# Patient Record
Sex: MALE | Race: WHITE | NOT HISPANIC OR LATINO | URBAN - METROPOLITAN AREA
[De-identification: names, ages, dates, MRNs, and addresses within clinical notes are randomized per-mention and may not be internally consistent; named-entity substitution may affect disease eponyms.]

---

## 2023-10-23 PROBLEM — E66.811 CLASS 1 OBESITY WITH BODY MASS INDEX (BMI) OF 30.0 TO 30.9 IN ADULT: Status: ACTIVE | Noted: 2023-10-23

## 2023-10-23 PROBLEM — E66.9 CLASS 1 OBESITY WITH BODY MASS INDEX (BMI) OF 30.0 TO 30.9 IN ADULT: Status: ACTIVE | Noted: 2023-10-23

## 2023-10-23 PROBLEM — F33.8 SEASONAL AFFECTIVE DISORDER (CMS-HCC): Status: ACTIVE | Noted: 2023-10-23

## 2023-10-23 PROBLEM — F32.A DEPRESSION, CONTROLLED: Status: ACTIVE | Noted: 2023-10-23

## 2023-10-23 RX ORDER — MONTELUKAST SODIUM 10 MG/1
TABLET ORAL
COMMUNITY

## 2023-10-23 RX ORDER — AZELASTINE HCL 205.5 UG/1
SPRAY NASAL
COMMUNITY

## 2023-10-23 RX ORDER — BUPROPION HYDROCHLORIDE 300 MG/1
1 TABLET ORAL DAILY
COMMUNITY
Start: 2018-12-18

## 2023-10-23 RX ORDER — TRIAMCINOLONE ACETONIDE 55 UG/1
SPRAY, METERED NASAL
COMMUNITY

## 2023-10-23 RX ORDER — PAROXETINE 30 MG/1
TABLET, FILM COATED ORAL
COMMUNITY

## 2023-10-23 RX ORDER — ALBUTEROL SULFATE 90 UG/1
AEROSOL, METERED RESPIRATORY (INHALATION)
COMMUNITY

## 2023-10-25 ENCOUNTER — ALLIED HEALTH (OUTPATIENT)
Dept: INTEGRATIVE MEDICINE | Facility: CLINIC | Age: 43
End: 2023-10-25
Payer: COMMERCIAL

## 2023-10-25 DIAGNOSIS — M99.04 SEGMENTAL AND SOMATIC DYSFUNCTION OF SACRAL REGION: ICD-10-CM

## 2023-10-25 DIAGNOSIS — M99.01 SEGMENTAL AND SOMATIC DYSFUNCTION OF CERVICAL REGION: ICD-10-CM

## 2023-10-25 DIAGNOSIS — M54.50 LUMBOSACRAL PAIN: ICD-10-CM

## 2023-10-25 DIAGNOSIS — M79.10 MYALGIA: ICD-10-CM

## 2023-10-25 DIAGNOSIS — M99.02 SEGMENTAL AND SOMATIC DYSFUNCTION OF THORACIC REGION: ICD-10-CM

## 2023-10-25 DIAGNOSIS — M54.2 CERVICALGIA: ICD-10-CM

## 2023-10-25 DIAGNOSIS — M99.03 SEGMENTAL AND SOMATIC DYSFUNCTION OF LUMBAR REGION: Primary | ICD-10-CM

## 2023-10-25 PROCEDURE — 99203 OFFICE O/P NEW LOW 30 MIN: CPT | Performed by: CHIROPRACTOR

## 2023-10-25 PROCEDURE — 97112 NEUROMUSCULAR REEDUCATION: CPT | Performed by: CHIROPRACTOR

## 2023-10-25 PROCEDURE — 98941 CHIROPRACT MANJ 3-4 REGIONS: CPT | Performed by: CHIROPRACTOR

## 2023-10-25 NOTE — PROGRESS NOTES
Subjective   Patient ID: Long Rice is a 43 y.o. male who presents today, October 25, 2023 for a new patient evaluation and treatment of low back and neck pain.    (1/12) [facility]    Chiropractic Medicine HPI:  Provacative factors include : sitting, standing, and walking  Palliative factors incude : stretching  Pain is described as : ache dull sharp   Previous treatment for complaint has included : ice Physical Therapy  Patient denies : difficulty walking bladder weakness bowel weakness catching clicking grinding instability numbness popping radiating pain into the distal extremity trauma  Intensity of the pain since last visit: Patient rates least severe pain at : 5/10 Patient rates most severe pain at : 8/10  Oswestry Disability Index has been completed: yes     Long presents for evaluation and treatment of low back and neck pain. Patient states that symptoms began many years ago without injury. He states that low back pain goes across the low back and into the right buttock and can go into the right groin. He states that neck pain starts at the base of the skull and goes to the top of the shoulder and to the lateral aspect of the upper arm. He states that sitting will increase neck symptoms while standing and walking increase low back symptoms. He states that he had PT for low back symptoms several years ago and continues to do the stretches and exercises from PT. Patient states that he has had previous chiropractic care for these symptoms and noted benefit from care. Patient denies any headaches, difficulty speaking/swallowing, vision changes, bowel/bladder issues, chest pain/shortness of breath, numbness/tingling.            Objective   Review of Systems   Constitutional:  Negative for chills, fatigue, fever and unexpected weight change.   HENT:  Negative for trouble swallowing.    Respiratory:  Negative for chest tightness and shortness of breath.    Cardiovascular:  Negative for chest pain and leg swelling.    Gastrointestinal:  Negative for vomiting.   Genitourinary:  Negative for difficulty urinating and flank pain.   Musculoskeletal:  Positive for back pain, myalgias, neck pain and neck stiffness. Negative for arthralgias, gait problem and joint swelling.   Neurological:  Negative for dizziness, weakness, light-headedness, numbness and headaches.   Psychiatric/Behavioral:  Negative for self-injury and suicidal ideas.    All other systems reviewed and are negative.    Physical Exam  Constitutional:       General: He is not in acute distress.     Appearance: Normal appearance.       HENT:      Head: Normocephalic and atraumatic.   Musculoskeletal:      Cervical back: Tenderness present. Decreased range of motion.      Thoracic back: Tenderness present.      Lumbar back: Tenderness present. Decreased range of motion.   Neurological:      General: No focal deficit present.      Mental Status: He is alert and oriented to person, place, and time.      Cranial Nerves: No dysarthria or facial asymmetry.      Sensory: Sensation is intact.      Motor: Motor function is intact.      Coordination: Coordination is intact.      Gait: Gait is intact.   Psychiatric:         Attention and Perception: Attention normal.         Mood and Affect: Mood normal.         Speech: Speech normal.         Behavior: Behavior is cooperative.        Spine Musculoskeletal Exam    Gait    Gait is normal.    Inspection    Shoulder elevation: left    Cervical Spine    Cervical spine inspection additional comments: Right hip hike    Palpation    Cervical Spine    Tenderness: present      Paraspinous: right and left      Trapezius: left      Periscapular: right and left    Right      Muscle tone: increased    Left      Muscle tone: increased    Thoracolumbar    Tenderness: present      Paraspinous: right and left      Gluteal: right      Piriformis: right    Right      Muscle tone: increased    Left      Muscle tone: increased    Range of Motion     Cervical Spine       Cervical flexion: chin to chest. Cervical flexion detail: no pain.     Cervical extension: reduced extension (0-25%). Cervical extension detail: pain.       Right      Lateral bending: normal. Lateral bending detail: no pain.       Lateral rotation: reduced rotation (0-25%). Lateral rotation detail: no pain.       Left      Lateral bending: reduced bend (0-25%). Lateral bending detail: pain.       Lateral rotation: reduced rotation (0-25%). Lateral rotation detail: pain.      Thoracolumbar       Flexion: 75%. Flexion detail: no pain.     Extension: 75%. Extension detail: pain.       Right      Lateral bendin%. Lateral bending detail: no pain.       Lateral rotation: normal. Lateral rotation detail: no pain.       Left      Lateral bendin%. Lateral bending detail: pain.       Lateral rotation: normal. Lateral rotation detail: no pain.      Strength    Cervical Spine    Cervical spine motor exam is normal.    Thoracolumbar    Thoracolumbar motor exam is normal.       Sensory    Cervical Spine    Cervical spine sensation is normal.    Thoracolumbar    Thoracolumbar sensation is normal.    Special Tests    Cervical Spine    Cervical distraction - neck: decreased pain    General    Neurological: alert     Other Orthopedic Tests:  Cervical: Right Maximum compression with local pain.  Left Maximum compression with local pain.  Cervical distraction positive.  Thoracic/Lumbar/Sacrum: Right Melton's Lumbar with local pain.  Left Melton's Lumbar painless.  Right FABERE's with local pain.  Segmental Joint(s): Segmental joint dysfunction was assessed with motion palpation and is identified in the following areas:  Cervical : C4 C6 C7  Thoracic : T2., T3, T8, and T9  Lumbopelvic / Sacral SIJ : L4, L5, L5/S1, and R SIJ  Upper / Lower Extremities : L Glenohumeral joint, R Hip, and L Hip      Assessment/Plan   Today's Treatment Included: Chiropractic manipulation to the Segmental Joint(s) Cervical : C4 C6  C7 Segmental Joint(s) Lumbopelvic/Sacral SIJ : L4, L5, L5/S1, and R SIJ Segmental Joint(s) Thoracic : T2., T3, T8, and T9 Segmental Joints Upper/Lower Extremities : L Glenohumeral joint, R Hip, and L Hip  Treatment Techniques Used : Diversified CMT, Pelvic drop table technique, and Manual traction  Neuromuscular Re-Education (83825): Start time: 2:45 pm End time: 2:55 pm  1 Units  Pin and stretch  Soft-Tissue manipulation    Soft-tissue mobilization was performed in the following areas:   Cervical paraspinal mm. bilateral, Upper Trapezius left, and Levator Scap. left  Thoracic Paraspinal mm. bilateral  Lumbar Paraspinal mm. bilateral, Quadratus Lumborum right, Gluteal mm. Glute. Med. right, and Piriformis right            Treatment Plan: The patient and I discussed the risks and benefits of Chiropractic Care.  Based on the patient's subjective complaints along with the examination findings, it is advised that a course of Chiropractic Treatment by initiated in the form of:   Chiropractic Manipulation (CMT)  Neuro-Muscular Re-education  Chiropractic treatment recommended at a frequency of 1 times per week for 4 weeks  Consent for care was given both written and orally by the patient.  The goals of the treatment will be to: reduce lower back pain, reduce neck pain, improve quality of life, improve ability to walk, improve the ability to stand, and decrease muscular hypertonicity  The patient tolerated today's treatment with little or no additional discomfort and was instructed to contact the office for questions or concerns.   Follow up as scheduled.

## 2023-10-26 ASSESSMENT — ENCOUNTER SYMPTOMS
FLANK PAIN: 0
NUMBNESS: 0
HEADACHES: 0
DIZZINESS: 0
JOINT SWELLING: 0
FEVER: 0
TROUBLE SWALLOWING: 0
SHORTNESS OF BREATH: 0
BACK PAIN: 1
WEAKNESS: 0
CHEST TIGHTNESS: 0
NECK PAIN: 1
CHILLS: 0
MYALGIAS: 1
LIGHT-HEADEDNESS: 0
UNEXPECTED WEIGHT CHANGE: 0
ARTHRALGIAS: 0
NECK STIFFNESS: 1
VOMITING: 0
FATIGUE: 0
DIFFICULTY URINATING: 0

## 2023-11-01 ENCOUNTER — ALLIED HEALTH (OUTPATIENT)
Dept: INTEGRATIVE MEDICINE | Facility: CLINIC | Age: 43
End: 2023-11-01
Payer: COMMERCIAL

## 2023-11-01 DIAGNOSIS — M79.10 MYALGIA: ICD-10-CM

## 2023-11-01 DIAGNOSIS — M54.50 LUMBOSACRAL PAIN: ICD-10-CM

## 2023-11-01 DIAGNOSIS — M54.2 CERVICALGIA: ICD-10-CM

## 2023-11-01 DIAGNOSIS — M99.04 SEGMENTAL AND SOMATIC DYSFUNCTION OF SACRAL REGION: ICD-10-CM

## 2023-11-01 DIAGNOSIS — M99.01 SEGMENTAL AND SOMATIC DYSFUNCTION OF CERVICAL REGION: ICD-10-CM

## 2023-11-01 DIAGNOSIS — M99.03 SEGMENTAL AND SOMATIC DYSFUNCTION OF LUMBAR REGION: Primary | ICD-10-CM

## 2023-11-01 DIAGNOSIS — M99.02 SEGMENTAL AND SOMATIC DYSFUNCTION OF THORACIC REGION: ICD-10-CM

## 2023-11-01 PROCEDURE — 97112 NEUROMUSCULAR REEDUCATION: CPT | Performed by: CHIROPRACTOR

## 2023-11-01 PROCEDURE — 98941 CHIROPRACT MANJ 3-4 REGIONS: CPT | Performed by: CHIROPRACTOR

## 2023-11-01 NOTE — PROGRESS NOTES
Subjective   Patient ID: Long Rice is a 43 y.o. male who presents November 1, 2023 for low back and neck pain.    (2/12) [facility]    Today, the patient rates their degree of pain as a 4 out of 10 on the numeric pain rating scale.     Long presents for continued treatment of low back and neck pain. Patient states that he had improvement in symptoms after last visit that have lasted until today. He states that he has less sharp pain in his right hip, it is more achy. He states that he has less discomfort in his neck and better cervical range of motion. He denies any soreness after last visit. Denies any associated symptoms or change in medical history since last visit and will follow up in 1 week.           Objective   Physical Exam  Constitutional:       General: He is not in acute distress.     Appearance: Normal appearance.       HENT:      Head: Normocephalic and atraumatic.   Musculoskeletal:      Cervical back: Tenderness present. Decreased range of motion.      Thoracic back: Tenderness present.      Lumbar back: Tenderness present. Decreased range of motion.   Neurological:      General: No focal deficit present.      Mental Status: He is alert and oriented to person, place, and time.      Cranial Nerves: No dysarthria or facial asymmetry.      Sensory: Sensation is intact.      Motor: Motor function is intact.      Coordination: Coordination is intact.      Gait: Gait is intact.   Psychiatric:         Attention and Perception: Attention normal.         Mood and Affect: Mood normal.         Speech: Speech normal.         Behavior: Behavior is cooperative.       Palpation of the following region(s) revealed:  Cervical: Upper trapezius left, hypertonicity and tenderness.  Cervical paraspinals bilateral, hypertonicity and tenderness.  Thoracic: Thoracic paraspinals bilateral, hypertonicity and tenderness.  Lumbar: Lumbar paraspinals bilateral, hypertonicity and tenderness.  Gluteal right, hypertonicity and  tenderness.  Piriformis right, hypertonicity and tenderness.        Segmental Joint(s): Segmental joint dysfunction was assessed with motion palpation and is identified in the following areas:  Cervical : C4 C6 C7  Thoracic : T2., T3, T8, and T9  Lumbopelvic / Sacral SIJ : L4, L5, L5/S1, and R SIJ  Upper / Lower Extremities : L Glenohumeral joint, R Hip, and L Hip      Assessment/Plan   Today's Treatment Included: Chiropractic manipulation to the Segmental Joint(s) Cervical : C4 C6 C7 Segmental Joint(s) Lumbopelvic/Sacral SIJ : L4, L5, L5/S1, and R SIJ Segmental Joint(s) Thoracic : T2., T3, T8, and T9 Segmental Joints Upper/Lower Extremities : L Glenohumeral joint, R Hip, and L Hip  Treatment Techniques Used : Diversified CMT, Pelvic drop table technique, and Manual traction  Neuromuscular Re-Education (85369): Start time: 2:25 pm End time: 2:35 pm  1 Units  Pin and stretch  Soft-Tissue manipulation    Soft-tissue mobilization was performed in the following areas:   Cervical paraspinal mm. bilateral and Upper Trapezius left  Thoracic Paraspinal mm. bilateral and Middle Trapezius bilateral  Lumbar Paraspinal mm. bilateral and Gluteal mm. Glute. Med. right            The patient tolerated today's treatment with little or no additional discomfort and was instructed to contact the office for questions or concerns.

## 2023-11-08 ENCOUNTER — ALLIED HEALTH (OUTPATIENT)
Dept: INTEGRATIVE MEDICINE | Facility: CLINIC | Age: 43
End: 2023-11-08
Payer: COMMERCIAL

## 2023-11-08 DIAGNOSIS — M99.04 SEGMENTAL AND SOMATIC DYSFUNCTION OF SACRAL REGION: ICD-10-CM

## 2023-11-08 DIAGNOSIS — M54.50 LUMBOSACRAL PAIN: ICD-10-CM

## 2023-11-08 DIAGNOSIS — M99.03 SEGMENTAL AND SOMATIC DYSFUNCTION OF LUMBAR REGION: Primary | ICD-10-CM

## 2023-11-08 DIAGNOSIS — M79.10 MYALGIA: ICD-10-CM

## 2023-11-08 DIAGNOSIS — M99.02 SEGMENTAL AND SOMATIC DYSFUNCTION OF THORACIC REGION: ICD-10-CM

## 2023-11-08 DIAGNOSIS — M99.01 SEGMENTAL AND SOMATIC DYSFUNCTION OF CERVICAL REGION: ICD-10-CM

## 2023-11-08 DIAGNOSIS — M54.2 CERVICALGIA: ICD-10-CM

## 2023-11-08 PROCEDURE — 98941 CHIROPRACT MANJ 3-4 REGIONS: CPT | Performed by: CHIROPRACTOR

## 2023-11-08 PROCEDURE — 97112 NEUROMUSCULAR REEDUCATION: CPT | Performed by: CHIROPRACTOR

## 2023-11-08 NOTE — PROGRESS NOTES
Subjective   Patient ID: Long Rice is a 43 y.o. male who presents November 8, 2023 for low back and neck pain.    (3/12) [facility]    Today, the patient rates their degree of pain as a 3 out of 10 on the numeric pain rating scale.     Long presents for continued treatment of low back and neck pain. Patient states that he continues to have improvement in symptoms. He states that he has not had any sharp pain in his low back since treatment began. He states that he has more stiffness in his neck/upper back that pain. Denies any associated symptoms or change in medical history since last visit and will follow up in 1 week.           Objective   Physical Exam  Constitutional:       General: He is not in acute distress.     Appearance: Normal appearance.       HENT:      Head: Normocephalic and atraumatic.   Musculoskeletal:      Cervical back: Tenderness present. Decreased range of motion.      Thoracic back: Tenderness present.      Lumbar back: Tenderness present. Decreased range of motion.   Neurological:      General: No focal deficit present.      Mental Status: He is alert and oriented to person, place, and time.      Cranial Nerves: No dysarthria or facial asymmetry.      Sensory: Sensation is intact.      Motor: Motor function is intact.      Coordination: Coordination is intact.      Gait: Gait is intact.   Psychiatric:         Attention and Perception: Attention normal.         Mood and Affect: Mood normal.         Speech: Speech normal.         Behavior: Behavior is cooperative.       Palpation of the following region(s) revealed:  Cervical: Upper trapezius left, hypertonicity and tenderness.  Cervical paraspinals bilateral, hypertonicity and tenderness.  Thoracic: Thoracic paraspinals bilateral, hypertonicity and tenderness.  Lumbar: Lumbar paraspinals bilateral, hypertonicity and tenderness.  Gluteal right, hypertonicity and tenderness.  Piriformis right, hypertonicity and tenderness.        Segmental  Joint(s): Segmental joint dysfunction was assessed with motion palpation and is identified in the following areas:  Cervical : C4 C6 C7  Thoracic : T2., T3, T8, and T9  Lumbopelvic / Sacral SIJ : L4, L5, L5/S1, and R SIJ  Upper / Lower Extremities : L Glenohumeral joint, R Hip, and L Hip      Assessment/Plan   Today's Treatment Included: Chiropractic manipulation to the Segmental Joint(s) Cervical : C4 C6 C7 Segmental Joint(s) Lumbopelvic/Sacral SIJ : L4, L5, L5/S1, and R SIJ Segmental Joint(s) Thoracic : T2., T3, T8, and T9 Segmental Joints Upper/Lower Extremities : L Glenohumeral joint, R Hip, and L Hip  Treatment Techniques Used : Diversified CMT, Pelvic drop table technique, and Manual traction  Neuromuscular Re-Education (67194): Start time: 2:25 pm End time: 2:35 pm  1 Units  Pin and stretch  Soft-Tissue manipulation    Soft-tissue mobilization was performed in the following areas:   Cervical paraspinal mm. bilateral and Upper Trapezius left  Thoracic Paraspinal mm. bilateral and Middle Trapezius bilateral  Lumbar Paraspinal mm. bilateral and Gluteal mm. Glute. Med. right            The patient tolerated today's treatment with little or no additional discomfort and was instructed to contact the office for questions or concerns.

## 2023-11-14 ENCOUNTER — OFFICE VISIT (OUTPATIENT)
Dept: PRIMARY CARE | Facility: CLINIC | Age: 43
End: 2023-11-14
Payer: COMMERCIAL

## 2023-11-14 VITALS
OXYGEN SATURATION: 96 % | HEIGHT: 74 IN | BODY MASS INDEX: 29.03 KG/M2 | HEART RATE: 91 BPM | SYSTOLIC BLOOD PRESSURE: 121 MMHG | DIASTOLIC BLOOD PRESSURE: 86 MMHG | WEIGHT: 226.2 LBS

## 2023-11-14 DIAGNOSIS — F32.A DEPRESSION, CONTROLLED: ICD-10-CM

## 2023-11-14 DIAGNOSIS — F33.8 SEASONAL AFFECTIVE DISORDER (CMS-HCC): ICD-10-CM

## 2023-11-14 DIAGNOSIS — Z00.00 HEALTHCARE MAINTENANCE: Primary | ICD-10-CM

## 2023-11-14 PROCEDURE — 99396 PREV VISIT EST AGE 40-64: CPT | Performed by: STUDENT IN AN ORGANIZED HEALTH CARE EDUCATION/TRAINING PROGRAM

## 2023-11-14 PROCEDURE — 1036F TOBACCO NON-USER: CPT | Performed by: STUDENT IN AN ORGANIZED HEALTH CARE EDUCATION/TRAINING PROGRAM

## 2023-11-14 NOTE — PROGRESS NOTES
"Subjective   Patient ID: Long Rice is a 43 y.o. male who presents for No chief complaint on file..    HPI    Doing well since last in. His drawings are doing well, and he's in final stages of creating a board game.     Re: Weight - Has lost ~20 lb since last in, now in the overweight category. Cleaned up his diet demonstrably since last in.     Re: depression - controlled on wellbutrin and paxil. His psych provider is retiring soon and will need refills through me going forward. I will refill when next due. In with psychiatry q3 months and psychology q2 weeks.     Re: HM - flu shot is current. CRS and PSA not yet due.       PMHx, FHx, Social Hx, Surg Hx personally reviewed at this appointment. No pertinent findings and/or changes from prior (if applicable).     ROS: Denies wt gain/loss f/c HA LoC CP SOB NVDC. See HPI above, and scanned sheet (if applicable). All other systems are reviewed and are without complaint.     Review of Systems    Objective   /86   Pulse 91   Ht 1.88 m (6' 2\")   Wt 103 kg (226 lb 3.2 oz)   SpO2 96%   BMI 29.04 kg/m²     Physical Exam  Gen: well developed in NAD. AAO x3.  HEENT: NC/AT. Anicteric sclera, symmetric pupils. MMM no thrush.  Neck: Soft, supple. No LAD. No goiter.   CV: RRR nl s1s2 no m/r/g  Pulm: CTAB no w/r/r, good air exchange  GI: soft NTND BS+ no hsm  Ext: WWP no edema  Neuro: II-XII grossly intact, nonfocal systemic findings  MSK: 5/5 strength b/l UE and LE  Gait: unremarkable    Lab Results   Component Value Date    WBC 8.7 09/08/2022    HGB 15.0 09/08/2022    HCT 46.7 09/08/2022     09/08/2022    CHOL 209 (H) 09/08/2022    TRIG 190 (H) 09/08/2022    HDL 39.0 (A) 09/08/2022    ALT 44 09/08/2022    AST 24 09/08/2022     09/08/2022    K 4.9 09/08/2022     09/08/2022    CREATININE 0.96 09/08/2022    BUN 11 09/08/2022    CO2 30 09/08/2022    TSH 1.98 09/08/2022    HGBA1C 5.1 09/08/2022     par    ELECTROCARDIOGRAM RHYTHM STRIP  Ordered by an " unspecified provider.     Assessment/Plan   # Depression and/or Anxiety: stable  - continue SSRI and Wellbutrin     # Health Maintenance  - routine blood work  - Colonoscopy: Not yet indicated   - PSA: Not yet indicated  - Immunizations: UTD, recommend COVID booster at pharmacy     Problem List Items Addressed This Visit             ICD-10-CM    Seasonal affective disorder (CMS/LTAC, located within [facility]) F33.8    Depression, controlled F32.A     Other Visit Diagnoses         Codes    Healthcare maintenance    -  Primary Z00.00    Relevant Orders    CBC and Auto Differential    Comprehensive Metabolic Panel    Lipid Panel    Hemoglobin A1C

## 2023-11-14 NOTE — PATIENT INSTRUCTIONS
Please stop at the lab ([address]) to complete your blood and/or urine work that I've ordered for you.    I will contact you with the results at my soonest convenience. I strongly urge you to use [employer] as this is the quickest and easiest way to access your results and receive my correspondences.    Congrats on the weight loss! Keep at it with diet and exercise.     Your medications are up to date today, I will renew them when a refill is due.     See me yearly for a complete physical exam, and sooner as needed for sick visits

## 2023-11-15 ENCOUNTER — ALLIED HEALTH (OUTPATIENT)
Dept: INTEGRATIVE MEDICINE | Facility: CLINIC | Age: 43
End: 2023-11-15
Payer: COMMERCIAL

## 2023-11-15 DIAGNOSIS — M99.04 SEGMENTAL AND SOMATIC DYSFUNCTION OF SACRAL REGION: ICD-10-CM

## 2023-11-15 DIAGNOSIS — M99.02 SEGMENTAL AND SOMATIC DYSFUNCTION OF THORACIC REGION: ICD-10-CM

## 2023-11-15 DIAGNOSIS — M99.01 SEGMENTAL AND SOMATIC DYSFUNCTION OF CERVICAL REGION: ICD-10-CM

## 2023-11-15 DIAGNOSIS — M54.50 LUMBOSACRAL PAIN: ICD-10-CM

## 2023-11-15 DIAGNOSIS — M54.2 CERVICALGIA: ICD-10-CM

## 2023-11-15 DIAGNOSIS — M99.03 SEGMENTAL AND SOMATIC DYSFUNCTION OF LUMBAR REGION: Primary | ICD-10-CM

## 2023-11-15 DIAGNOSIS — M79.10 MYALGIA: ICD-10-CM

## 2023-11-15 PROCEDURE — 98941 CHIROPRACT MANJ 3-4 REGIONS: CPT | Performed by: CHIROPRACTOR

## 2023-11-15 PROCEDURE — 97112 NEUROMUSCULAR REEDUCATION: CPT | Performed by: CHIROPRACTOR

## 2023-11-15 NOTE — PROGRESS NOTES
Subjective   Patient ID: Long Rice is a 43 y.o. male who presents November 15, 2023 for low back and neck pain.    (4/12) [facility]    Today, the patient rates their degree of pain as a 2 out of 10 on the numeric pain rating scale.     Long presents for continued treatment of low back and neck pain. Patient states that he continues to have improvement in symptoms. He states that he does not have any pain going into his right leg. He states that rolling on a tennis ball has helped reduce symptoms in the buttock.  He states that he has had improvement in neck/upper back symptoms. Denies any associated symptoms or change in medical history since last visit and will follow up in 1 week.           Objective   Physical Exam  Constitutional:       General: He is not in acute distress.     Appearance: Normal appearance.       HENT:      Head: Normocephalic and atraumatic.   Musculoskeletal:      Cervical back: Tenderness present. Decreased range of motion.      Thoracic back: Tenderness present.      Lumbar back: Tenderness present. Decreased range of motion.   Neurological:      General: No focal deficit present.      Mental Status: He is alert and oriented to person, place, and time.      Cranial Nerves: No dysarthria or facial asymmetry.      Sensory: Sensation is intact.      Motor: Motor function is intact.      Coordination: Coordination is intact.      Gait: Gait is intact.   Psychiatric:         Attention and Perception: Attention normal.         Mood and Affect: Mood normal.         Speech: Speech normal.         Behavior: Behavior is cooperative.         Palpation of the following region(s) revealed:  Cervical: Upper trapezius left, hypertonicity and tenderness.  Cervical paraspinals bilateral, hypertonicity and tenderness.  Thoracic: Thoracic paraspinals bilateral, hypertonicity and tenderness.  Lumbar: Lumbar paraspinals bilateral, hypertonicity and tenderness.  Gluteal right, hypertonicity and  tenderness.  Piriformis right, hypertonicity and tenderness.        Segmental Joint(s): Segmental joint dysfunction was assessed with motion palpation and is identified in the following areas:  Cervical : C4 C6 C7  Thoracic : T2., T3, T8, and T9  Lumbopelvic / Sacral SIJ : L4, L5, L5/S1, and R SIJ  Upper / Lower Extremities : L Glenohumeral joint, R Hip, and L Hip      Assessment/Plan   Today's Treatment Included: Chiropractic manipulation to the Segmental Joint(s) Cervical : C4 C6 C7 Segmental Joint(s) Lumbopelvic/Sacral SIJ : L4, L5, L5/S1, and R SIJ Segmental Joint(s) Thoracic : T2., T3, T8, and T9 Segmental Joints Upper/Lower Extremities : L Glenohumeral joint, R Hip, and L Hip  Treatment Techniques Used : Diversified CMT, Pelvic drop table technique, and Manual traction  Neuromuscular Re-Education (13111): Start time: 2:45 pm End time: 2:55 pm  1 Units  Pin and stretch  Soft-Tissue manipulation    Soft-tissue mobilization was performed in the following areas:   Cervical paraspinal mm. left, Upper Trapezius left, and Levator Scap. left  Thoracic Paraspinal mm. bilateral and Middle Trapezius bilateral  Lumbar Paraspinal mm. bilateral, Gluteal mm. Glute. Med. right, and Piriformis right            The patient tolerated today's treatment with little or no additional discomfort and was instructed to contact the office for questions or concerns.

## 2023-11-17 ENCOUNTER — LAB (OUTPATIENT)
Dept: LAB | Facility: LAB | Age: 43
End: 2023-11-17
Payer: COMMERCIAL

## 2023-11-17 DIAGNOSIS — Z00.00 HEALTHCARE MAINTENANCE: ICD-10-CM

## 2023-11-17 LAB
ALBUMIN SERPL BCP-MCNC: 4.3 G/DL (ref 3.4–5)
ALP SERPL-CCNC: 59 U/L (ref 33–120)
ALT SERPL W P-5'-P-CCNC: 38 U/L (ref 10–52)
ANION GAP SERPL CALC-SCNC: 12 MMOL/L (ref 10–20)
AST SERPL W P-5'-P-CCNC: 23 U/L (ref 9–39)
BASOPHILS # BLD AUTO: 0.05 X10*3/UL (ref 0–0.1)
BASOPHILS NFR BLD AUTO: 0.6 %
BILIRUB SERPL-MCNC: 0.6 MG/DL (ref 0–1.2)
BUN SERPL-MCNC: 13 MG/DL (ref 6–23)
CALCIUM SERPL-MCNC: 9.3 MG/DL (ref 8.6–10.6)
CHLORIDE SERPL-SCNC: 104 MMOL/L (ref 98–107)
CHOLEST SERPL-MCNC: 171 MG/DL (ref 0–199)
CHOLESTEROL/HDL RATIO: 4
CO2 SERPL-SCNC: 27 MMOL/L (ref 21–32)
CREAT SERPL-MCNC: 0.93 MG/DL (ref 0.5–1.3)
EOSINOPHIL # BLD AUTO: 0.39 X10*3/UL (ref 0–0.7)
EOSINOPHIL NFR BLD AUTO: 4.8 %
ERYTHROCYTE [DISTWIDTH] IN BLOOD BY AUTOMATED COUNT: 12.6 % (ref 11.5–14.5)
EST. AVERAGE GLUCOSE BLD GHB EST-MCNC: 91 MG/DL
GFR SERPL CREATININE-BSD FRML MDRD: >90 ML/MIN/1.73M*2
GLUCOSE SERPL-MCNC: 89 MG/DL (ref 74–99)
HBA1C MFR BLD: 4.8 %
HCT VFR BLD AUTO: 44.8 % (ref 41–52)
HDLC SERPL-MCNC: 42.7 MG/DL
HGB BLD-MCNC: 14.9 G/DL (ref 13.5–17.5)
IMM GRANULOCYTES # BLD AUTO: 0.02 X10*3/UL (ref 0–0.7)
IMM GRANULOCYTES NFR BLD AUTO: 0.2 % (ref 0–0.9)
LDLC SERPL CALC-MCNC: 106 MG/DL
LYMPHOCYTES # BLD AUTO: 2.14 X10*3/UL (ref 1.2–4.8)
LYMPHOCYTES NFR BLD AUTO: 26.3 %
MCH RBC QN AUTO: 29.8 PG (ref 26–34)
MCHC RBC AUTO-ENTMCNC: 33.3 G/DL (ref 32–36)
MCV RBC AUTO: 90 FL (ref 80–100)
MONOCYTES # BLD AUTO: 0.66 X10*3/UL (ref 0.1–1)
MONOCYTES NFR BLD AUTO: 8.1 %
NEUTROPHILS # BLD AUTO: 4.89 X10*3/UL (ref 1.2–7.7)
NEUTROPHILS NFR BLD AUTO: 60 %
NON HDL CHOLESTEROL: 128 MG/DL (ref 0–149)
NRBC BLD-RTO: 0 /100 WBCS (ref 0–0)
PLATELET # BLD AUTO: 323 X10*3/UL (ref 150–450)
POTASSIUM SERPL-SCNC: 4.1 MMOL/L (ref 3.5–5.3)
PROT SERPL-MCNC: 6.6 G/DL (ref 6.4–8.2)
RBC # BLD AUTO: 5 X10*6/UL (ref 4.5–5.9)
SODIUM SERPL-SCNC: 139 MMOL/L (ref 136–145)
TRIGL SERPL-MCNC: 113 MG/DL (ref 0–149)
VLDL: 23 MG/DL (ref 0–40)
WBC # BLD AUTO: 8.2 X10*3/UL (ref 4.4–11.3)

## 2023-11-17 PROCEDURE — 80053 COMPREHEN METABOLIC PANEL: CPT

## 2023-11-17 PROCEDURE — 36415 COLL VENOUS BLD VENIPUNCTURE: CPT

## 2023-11-17 PROCEDURE — 85025 COMPLETE CBC W/AUTO DIFF WBC: CPT

## 2023-11-17 PROCEDURE — 83036 HEMOGLOBIN GLYCOSYLATED A1C: CPT

## 2023-11-17 PROCEDURE — 80061 LIPID PANEL: CPT

## 2023-11-21 ENCOUNTER — ALLIED HEALTH (OUTPATIENT)
Dept: INTEGRATIVE MEDICINE | Facility: CLINIC | Age: 43
End: 2023-11-21
Payer: COMMERCIAL

## 2023-11-21 DIAGNOSIS — M54.50 LUMBOSACRAL PAIN: ICD-10-CM

## 2023-11-21 DIAGNOSIS — M99.01 SEGMENTAL AND SOMATIC DYSFUNCTION OF CERVICAL REGION: ICD-10-CM

## 2023-11-21 DIAGNOSIS — M99.03 SEGMENTAL AND SOMATIC DYSFUNCTION OF LUMBAR REGION: Primary | ICD-10-CM

## 2023-11-21 DIAGNOSIS — M79.10 MYALGIA: ICD-10-CM

## 2023-11-21 DIAGNOSIS — M99.04 SEGMENTAL AND SOMATIC DYSFUNCTION OF SACRAL REGION: ICD-10-CM

## 2023-11-21 DIAGNOSIS — M54.2 CERVICALGIA: ICD-10-CM

## 2023-11-21 DIAGNOSIS — M99.02 SEGMENTAL AND SOMATIC DYSFUNCTION OF THORACIC REGION: ICD-10-CM

## 2023-11-21 PROCEDURE — 98941 CHIROPRACT MANJ 3-4 REGIONS: CPT | Performed by: CHIROPRACTOR

## 2023-11-21 PROCEDURE — 97112 NEUROMUSCULAR REEDUCATION: CPT | Performed by: CHIROPRACTOR

## 2023-11-21 NOTE — PROGRESS NOTES
Subjective   Patient ID: Long Rice is a 43 y.o. male who presents November 21, 2023 for low back and neck pain.    (5/12) [facility]    Today, the patient rates their degree of pain as a 1 out of 10 on the numeric pain rating scale.     Long presents for continued treatment of low back and neck pain. Patient states that he is doing well today. He states that he continues to have less pain in his low back and neck and greater range of motion in his neck. He states that his upper back is more tense today, he states that he has had a stressful week. Denies any associated symptoms or change in medical history since last visit and will follow up in 1 week.           Objective   Physical Exam  Constitutional:       General: He is not in acute distress.     Appearance: Normal appearance.       HENT:      Head: Normocephalic and atraumatic.   Musculoskeletal:      Cervical back: Tenderness present. Decreased range of motion.      Thoracic back: Tenderness present.      Lumbar back: Tenderness present. Decreased range of motion.   Neurological:      General: No focal deficit present.      Mental Status: He is alert and oriented to person, place, and time.      Cranial Nerves: No dysarthria or facial asymmetry.      Sensory: Sensation is intact.      Motor: Motor function is intact.      Coordination: Coordination is intact.      Gait: Gait is intact.   Psychiatric:         Attention and Perception: Attention normal.         Mood and Affect: Mood normal.         Speech: Speech normal.         Behavior: Behavior is cooperative.       Palpation of the following region(s) revealed:  Cervical: Upper trapezius left, hypertonicity and tenderness.  Cervical paraspinals bilateral, hypertonicity and tenderness.  Thoracic: Thoracic paraspinals bilateral, hypertonicity and tenderness.  Lumbar: Lumbar paraspinals bilateral, hypertonicity and tenderness.  Gluteal right, hypertonicity and tenderness.  Piriformis right, hypertonicity and  tenderness.        Segmental Joint(s): Segmental joint dysfunction was assessed with motion palpation and is identified in the following areas:  Cervical : C4 C6 C7  Thoracic : T2., T3, T8, and T9  Lumbopelvic / Sacral SIJ : L4, L5, L5/S1, and R SIJ  Upper / Lower Extremities : R Glenohumeral joint, L Glenohumeral joint, and R Hip      Assessment/Plan   Today's Treatment Included: Chiropractic manipulation to the Segmental Joint(s) Cervical : C4 C6 C7 Segmental Joint(s) Lumbopelvic/Sacral SIJ : L4, L5, L5/S1, and R SIJ Segmental Joint(s) Thoracic : T2., T3, T8, and T9 Segmental Joints Upper/Lower Extremities : R Glenohumeral joint, L Glenohumeral joint, and R Hip  Treatment Techniques Used : Diversified CMT, Pelvic drop table technique, and Manual traction  Neuromuscular Re-Education (50300): Start time: 2:05 pm End time: 2:15 pm  1 Units  Pin and stretch  Soft-Tissue manipulation    Soft-tissue mobilization was performed in the following areas:   Cervical paraspinal mm. left, Upper Trapezius left, and Levator Scap. left  Thoracic Paraspinal mm. bilateral and Middle Trapezius bilateral  Lumbar Paraspinal mm. bilateral, Gluteal mm. Glute. Med. right, and Piriformis right            The patient tolerated today's treatment with little or no additional discomfort and was instructed to contact the office for questions or concerns.

## 2023-12-13 ENCOUNTER — ALLIED HEALTH (OUTPATIENT)
Dept: INTEGRATIVE MEDICINE | Facility: CLINIC | Age: 43
End: 2023-12-13
Payer: COMMERCIAL

## 2023-12-13 DIAGNOSIS — M54.50 LUMBOSACRAL PAIN: ICD-10-CM

## 2023-12-13 DIAGNOSIS — M99.03 SEGMENTAL AND SOMATIC DYSFUNCTION OF LUMBAR REGION: Primary | ICD-10-CM

## 2023-12-13 DIAGNOSIS — M79.10 MYALGIA: ICD-10-CM

## 2023-12-13 DIAGNOSIS — M99.02 SEGMENTAL AND SOMATIC DYSFUNCTION OF THORACIC REGION: ICD-10-CM

## 2023-12-13 DIAGNOSIS — M54.2 CERVICALGIA: ICD-10-CM

## 2023-12-13 DIAGNOSIS — M99.01 SEGMENTAL AND SOMATIC DYSFUNCTION OF CERVICAL REGION: ICD-10-CM

## 2023-12-13 DIAGNOSIS — M99.04 SEGMENTAL AND SOMATIC DYSFUNCTION OF SACRAL REGION: ICD-10-CM

## 2023-12-13 PROCEDURE — 97112 NEUROMUSCULAR REEDUCATION: CPT | Performed by: CHIROPRACTOR

## 2023-12-13 PROCEDURE — 98941 CHIROPRACT MANJ 3-4 REGIONS: CPT | Performed by: CHIROPRACTOR

## 2023-12-14 NOTE — PROGRESS NOTES
Subjective   Patient ID: Long Rice is a 43 y.o. male who presents December 14, 2023 for low back and neck pain.    (6/12) VPCY    Today, the patient rates their degree of pain as a 3 out of 10 on the numeric pain rating scale.     Long presents for continued treatment of low back and neck pain. Patient states that he has had an increase in symptoms over the past few weeks. He states that his stress levels have been higher lately. He has right sided low back and hip pain. He states that his neck is more stiff and tight than painful. Denies any associated symptoms or change in medical history since last visit and will follow up in 1 week.           Objective   Physical Exam  Constitutional:       General: He is not in acute distress.     Appearance: Normal appearance.       HENT:      Head: Normocephalic and atraumatic.   Musculoskeletal:      Cervical back: Tenderness present. Decreased range of motion.      Thoracic back: Tenderness present.      Lumbar back: Tenderness present. Decreased range of motion.   Neurological:      General: No focal deficit present.      Mental Status: He is alert and oriented to person, place, and time.      Cranial Nerves: No dysarthria or facial asymmetry.      Sensory: Sensation is intact.      Motor: Motor function is intact.      Coordination: Coordination is intact.      Gait: Gait is intact.   Psychiatric:         Attention and Perception: Attention normal.         Mood and Affect: Mood normal.         Speech: Speech normal.         Behavior: Behavior is cooperative.         Palpation of the following region(s) revealed:  Cervical: Upper trapezius left, hypertonicity and tenderness.  Cervical paraspinals bilateral, hypertonicity and tenderness.  Thoracic: Thoracic paraspinals bilateral, hypertonicity and tenderness.  Lumbar: Lumbar paraspinals bilateral, hypertonicity and tenderness.  Gluteal right, hypertonicity and tenderness.  Piriformis right, hypertonicity and  tenderness.        Segmental Joint(s): Segmental joint dysfunction was assessed with motion palpation and is identified in the following areas:  Cervical : C4 C6 C7  Thoracic : T2., T3, T8, and T9  Lumbopelvic / Sacral SIJ : L4, L5, L5/S1, and R SIJ  Upper / Lower Extremities : R Glenohumeral joint, L Glenohumeral joint, and R Hip      Assessment/Plan   Today's Treatment Included: Chiropractic manipulation to the Segmental Joint(s) Cervical : C4 C6 C7 Segmental Joint(s) Lumbopelvic/Sacral SIJ : L4, L5, L5/S1, and R SIJ Segmental Joint(s) Thoracic : T2., T3, T8, and T9 Segmental Joints Upper/Lower Extremities : R Glenohumeral joint, L Glenohumeral joint, and R Hip  Treatment Techniques Used : Diversified CMT, Pelvic drop table technique, and Manual traction  Neuromuscular Re-Education (47844): Start time: 2:45 pm End time: 2:55 pm  1 Units  Pin and stretch  Soft-Tissue manipulation    Soft-tissue mobilization was performed in the following areas:   Cervical paraspinal mm. left, Upper Trapezius left, and Levator Scap. left  Thoracic Paraspinal mm. bilateral and Middle Trapezius bilateral  Lumbar Paraspinal mm. bilateral, Gluteal mm. Glute. Med. right, and Piriformis right            The patient tolerated today's treatment with little or no additional discomfort and was instructed to contact the office for questions or concerns.

## 2023-12-21 ENCOUNTER — ALLIED HEALTH (OUTPATIENT)
Dept: INTEGRATIVE MEDICINE | Facility: CLINIC | Age: 43
End: 2023-12-21
Payer: COMMERCIAL

## 2023-12-21 DIAGNOSIS — M54.50 LUMBOSACRAL PAIN: ICD-10-CM

## 2023-12-21 DIAGNOSIS — M99.04 SEGMENTAL AND SOMATIC DYSFUNCTION OF SACRAL REGION: ICD-10-CM

## 2023-12-21 DIAGNOSIS — M99.03 SEGMENTAL AND SOMATIC DYSFUNCTION OF LUMBAR REGION: Primary | ICD-10-CM

## 2023-12-21 DIAGNOSIS — M99.01 SEGMENTAL AND SOMATIC DYSFUNCTION OF CERVICAL REGION: ICD-10-CM

## 2023-12-21 DIAGNOSIS — M54.2 CERVICALGIA: ICD-10-CM

## 2023-12-21 DIAGNOSIS — M79.10 MYALGIA: ICD-10-CM

## 2023-12-21 DIAGNOSIS — M99.02 SEGMENTAL AND SOMATIC DYSFUNCTION OF THORACIC REGION: ICD-10-CM

## 2023-12-21 PROCEDURE — 98941 CHIROPRACT MANJ 3-4 REGIONS: CPT | Performed by: CHIROPRACTOR

## 2023-12-21 PROCEDURE — 97112 NEUROMUSCULAR REEDUCATION: CPT | Performed by: CHIROPRACTOR

## 2023-12-21 NOTE — PROGRESS NOTES
Subjective   Patient ID: Long Rice is a 43 y.o. male who presents December 21, 2023 for low back and neck pain.    (7/12) VPCY    Today, the patient rates their degree of pain as a 3 out of 10 on the numeric pain rating scale.     Long presents for continued treatment of low back and neck pain. Patient states that he has had improvement in symptoms after last visit that have lasted until today. He states that he has slightly more neck/upper back tension from end of year and holiday stress. Denies any associated symptoms or change in medical history since last visit and will follow up in 1 week.           Objective   Physical Exam  Constitutional:       General: He is not in acute distress.     Appearance: Normal appearance.       HENT:      Head: Normocephalic and atraumatic.   Musculoskeletal:      Cervical back: Tenderness present. Decreased range of motion.      Thoracic back: Tenderness present.      Lumbar back: Tenderness present. Decreased range of motion.   Neurological:      General: No focal deficit present.      Mental Status: He is alert and oriented to person, place, and time.      Cranial Nerves: No dysarthria or facial asymmetry.      Sensory: Sensation is intact.      Motor: Motor function is intact.      Coordination: Coordination is intact.      Gait: Gait is intact.   Psychiatric:         Attention and Perception: Attention normal.         Mood and Affect: Mood normal.         Speech: Speech normal.         Behavior: Behavior is cooperative.         Palpation of the following region(s) revealed:  Cervical: Upper trapezius left, hypertonicity and tenderness.  Cervical paraspinals bilateral, hypertonicity and tenderness.  Thoracic: Thoracic paraspinals bilateral, hypertonicity and tenderness.  Lumbar: Lumbar paraspinals bilateral, hypertonicity and tenderness.  Gluteal right, hypertonicity and tenderness.  Piriformis right, hypertonicity and tenderness.        Segmental Joint(s): Segmental  joint dysfunction was assessed with motion palpation and is identified in the following areas:  Cervical : C4 C6 C7  Thoracic : T2., T3, T8, and T9  Lumbopelvic / Sacral SIJ : L4, L5, L5/S1, and R SIJ  Upper / Lower Extremities : R Glenohumeral joint, L Glenohumeral joint, and R Hip      Assessment/Plan   Today's Treatment Included: Chiropractic manipulation to the Segmental Joint(s) Cervical : C4 C6 C7 Segmental Joint(s) Lumbopelvic/Sacral SIJ : L4, L5, L5/S1, and R SIJ Segmental Joint(s) Thoracic : T2., T3, T8, and T9 Segmental Joints Upper/Lower Extremities : R Glenohumeral joint, L Glenohumeral joint, and R Hip  Treatment Techniques Used : Diversified CMT, Pelvic drop table technique, and Manual traction  Neuromuscular Re-Education (58808): Start time: 2:05 pm End time: 2:15 pm  1 Units  Pin and stretch  Soft-Tissue manipulation    Soft-tissue mobilization was performed in the following areas:   Cervical paraspinal mm. left, Upper Trapezius left, and Levator Scap. left  Thoracic Paraspinal mm. bilateral and Middle Trapezius bilateral  Lumbar Paraspinal mm. bilateral, Gluteal mm. Glute. Med. right, and Piriformis right            The patient tolerated today's treatment with little or no additional discomfort and was instructed to contact the office for questions or concerns.

## 2024-07-16 ENCOUNTER — APPOINTMENT (OUTPATIENT)
Dept: INTEGRATIVE MEDICINE | Facility: CLINIC | Age: 44
End: 2024-07-16
Payer: COMMERCIAL

## 2024-07-16 DIAGNOSIS — M99.01 SEGMENTAL AND SOMATIC DYSFUNCTION OF CERVICAL REGION: Primary | ICD-10-CM

## 2024-07-16 DIAGNOSIS — M54.2 CERVICALGIA: ICD-10-CM

## 2024-07-16 DIAGNOSIS — M99.03 SEGMENTAL AND SOMATIC DYSFUNCTION OF LUMBAR REGION: ICD-10-CM

## 2024-07-16 DIAGNOSIS — M99.02 SEGMENTAL AND SOMATIC DYSFUNCTION OF THORACIC REGION: ICD-10-CM

## 2024-07-16 DIAGNOSIS — M79.10 MYALGIA: ICD-10-CM

## 2024-07-16 DIAGNOSIS — M54.50 LUMBOSACRAL PAIN: ICD-10-CM

## 2024-07-16 DIAGNOSIS — M99.04 SEGMENTAL AND SOMATIC DYSFUNCTION OF SACRAL REGION: ICD-10-CM

## 2024-07-16 PROCEDURE — 98941 CHIROPRACT MANJ 3-4 REGIONS: CPT | Performed by: CHIROPRACTOR

## 2024-07-16 PROCEDURE — 97112 NEUROMUSCULAR REEDUCATION: CPT | Performed by: CHIROPRACTOR

## 2024-07-16 NOTE — PROGRESS NOTES
Subjective   Patient ID: Long Rice is a 44 y.o. male who presents July 16, 2024 for low back and neck pain.    (1/12) VPCY    Today, the patient rates their degree of pain as a 4 out of 10 on the numeric pain rating scale.     Long presents for continued treatment of low back and neck pain. Patient states that he has had increased neck/upper back tension and discomfort. He states that life has been more stressful lately. He states that his mom has been in the hospital and they are also fostering a new dog. He states that low back/hip symptoms have been minimal, he states that he continues to do the stretches and exercises and that has helped relieve symptoms. Denies any associated symptoms or change in medical history since last visit and will follow up in 2-3 weeks.           Objective   Physical Exam  Constitutional:       General: He is not in acute distress.     Appearance: Normal appearance.       HENT:      Head: Normocephalic and atraumatic.   Musculoskeletal:      Cervical back: Tenderness present. Decreased range of motion.      Thoracic back: Tenderness present.      Lumbar back: Tenderness present. Decreased range of motion.   Neurological:      General: No focal deficit present.      Mental Status: He is alert and oriented to person, place, and time.      Cranial Nerves: No dysarthria or facial asymmetry.      Sensory: Sensation is intact.      Motor: Motor function is intact.      Coordination: Coordination is intact.      Gait: Gait is intact.   Psychiatric:         Attention and Perception: Attention normal.         Mood and Affect: Mood normal.         Speech: Speech normal.         Behavior: Behavior is cooperative.         Palpation of the following region(s) revealed:  Cervical: Upper trapezius left, hypertonicity and tenderness.  Cervical paraspinals bilateral, hypertonicity and tenderness.  Thoracic: Thoracic paraspinals bilateral, hypertonicity and tenderness.  Lumbar: Lumbar paraspinals  bilateral, hypertonicity and tenderness.  Gluteal right, hypertonicity and tenderness.  Piriformis right, hypertonicity and tenderness.        Segmental Joint(s): Segmental joint dysfunction was assessed with motion palpation and is identified in the following areas:  Cervical : C4 C6 C7  Thoracic : T2., T3, T8, and T9  Lumbopelvic / Sacral SIJ : L4, L5, L5/S1, and R SIJ  Upper / Lower Extremities : R Hip and L Hip      Assessment/Plan   Today's Treatment Included: Chiropractic manipulation to the Segmental Joint(s) Cervical : C4 C6 C7 Segmental Joint(s) Lumbopelvic/Sacral SIJ : L4, L5, L5/S1, and R SIJ Segmental Joint(s) Thoracic : T2., T3, T8, and T9 Segmental Joints Upper/Lower Extremities : R Hip and L Hip  Treatment Techniques Used : Diversified CMT, Pelvic drop table technique, and Manual traction  Neuromuscular Re-Education (71877): Start time: 10:45 am End time: 10:55 am  1 Units  Pin and stretch  Soft-Tissue manipulation    Soft-tissue mobilization was performed in the following areas:   Cervical paraspinal mm. left, Upper Trapezius left, and Levator Scap. left  Thoracic Paraspinal mm. bilateral and Middle Trapezius bilateral  Lumbar Paraspinal mm. bilateral, Gluteal mm. Glute. Med. right, and Piriformis right            The patient tolerated today's treatment with little or no additional discomfort and was instructed to contact the office for questions or concerns.

## 2024-10-01 ENCOUNTER — APPOINTMENT (OUTPATIENT)
Dept: INTEGRATIVE MEDICINE | Facility: CLINIC | Age: 44
End: 2024-10-01
Payer: COMMERCIAL

## 2024-10-01 DIAGNOSIS — M99.02 SEGMENTAL AND SOMATIC DYSFUNCTION OF THORACIC REGION: ICD-10-CM

## 2024-10-01 DIAGNOSIS — M54.2 CERVICALGIA: ICD-10-CM

## 2024-10-01 DIAGNOSIS — M99.01 SEGMENTAL AND SOMATIC DYSFUNCTION OF CERVICAL REGION: Primary | ICD-10-CM

## 2024-10-01 DIAGNOSIS — M99.03 SEGMENTAL AND SOMATIC DYSFUNCTION OF LUMBAR REGION: ICD-10-CM

## 2024-10-01 DIAGNOSIS — M99.04 SEGMENTAL AND SOMATIC DYSFUNCTION OF SACRAL REGION: ICD-10-CM

## 2024-10-01 DIAGNOSIS — M79.10 MYALGIA: ICD-10-CM

## 2024-10-01 DIAGNOSIS — M54.50 LUMBOSACRAL PAIN: ICD-10-CM

## 2024-10-01 NOTE — PROGRESS NOTES
Subjective   Patient ID: Long Rice is a 44 y.o. male who presents October 1, 2024 for low back and neck pain.    (2/12) VPCY    Today, the patient rates their degree of pain as a 4 out of 10 on the numeric pain rating scale.     Long presents for continued treatment of low back and neck pain. Patient states that he has increased low back and right buttock/leg pain after driving to the [employer] last week. He states that pain goes down the lateral right leg to the calf and is very tight. He states that he had numbness and tingling going into the leg but that has dissipated. He   states that neck/upper back tension is about the same as last visit. He states that life stressors have increased recently. Denies any associated symptoms or change in medical history since last visit and will follow up in 1-2 weeks.           Objective   Physical Exam  Constitutional:       General: He is not in acute distress.     Appearance: Normal appearance.       HENT:      Head: Normocephalic and atraumatic.   Musculoskeletal:      Cervical back: Tenderness present. Decreased range of motion.      Thoracic back: Tenderness present.      Lumbar back: Tenderness present. Decreased range of motion.   Neurological:      General: No focal deficit present.      Mental Status: He is alert and oriented to person, place, and time.      Cranial Nerves: No dysarthria or facial asymmetry.      Sensory: Sensation is intact.      Motor: Motor function is intact.      Coordination: Coordination is intact.      Gait: Gait is intact.   Psychiatric:         Attention and Perception: Attention normal.         Mood and Affect: Mood normal.         Speech: Speech normal.         Behavior: Behavior is cooperative.         Palpation of the following region(s) revealed:  Cervical: Upper trapezius left, hypertonicity and tenderness.  Cervical paraspinals bilateral, hypertonicity and tenderness.  Thoracic: Thoracic paraspinals bilateral,  hypertonicity and tenderness.  Lumbar: Lumbar paraspinals bilateral, hypertonicity and tenderness.  Gluteal right, hypertonicity and tenderness.  Piriformis right, hypertonicity and tenderness.        Segmental Joint(s): Segmental joint dysfunction was assessed with motion palpation and is identified in the following areas:  Cervical : C4 C6 C7  Thoracic : T2., T3, T8, and T9  Lumbopelvic / Sacral SIJ : L4, L5, L5/S1, and R SIJ  Upper / Lower Extremities : R Hip and L Hip      Assessment/Plan   Today's Treatment Included: Chiropractic manipulation to the Segmental Joint(s) Cervical : C4 C6 C7 Segmental Joint(s) Lumbopelvic/Sacral SIJ : L4, L5, L5/S1, and R SIJ Segmental Joint(s) Thoracic : T2., T3, T8, and T9 Segmental Joints Upper/Lower Extremities : R Hip and L Hip  Treatment Techniques Used : Diversified CMT, Pelvic drop table technique, and Manual traction  Neuromuscular Re-Education (81486): Start time: 2:05 pm End time: 2:15 pm  1 Units  Pin and stretch  Soft-Tissue manipulation    Soft-tissue mobilization was performed in the following areas:   Cervical paraspinal mm. left, Upper Trapezius left, and Levator Scap. left  Thoracic Paraspinal mm. bilateral and Middle Trapezius bilateral  Lumbar Paraspinal mm. bilateral, Gluteal mm. Glute. Med. right, and Piriformis right      Patient was shown gluteal stretch.      The patient tolerated today's treatment with little or no additional discomfort and was instructed to contact the office for questions or concerns.

## 2025-02-09 ENCOUNTER — OFFICE VISIT (OUTPATIENT)
Dept: URGENT CARE | Age: 45
End: 2025-02-09
Payer: COMMERCIAL

## 2025-02-09 ENCOUNTER — HOSPITAL ENCOUNTER (OUTPATIENT)
Dept: RADIOLOGY | Facility: CLINIC | Age: 45
Discharge: HOME | End: 2025-02-09
Payer: COMMERCIAL

## 2025-02-09 VITALS
HEART RATE: 98 BPM | OXYGEN SATURATION: 97 % | DIASTOLIC BLOOD PRESSURE: 93 MMHG | SYSTOLIC BLOOD PRESSURE: 145 MMHG | RESPIRATION RATE: 17 BRPM | TEMPERATURE: 98.6 F

## 2025-02-09 DIAGNOSIS — S63.502A SPRAIN OF LEFT WRIST, INITIAL ENCOUNTER: ICD-10-CM

## 2025-02-09 DIAGNOSIS — S52.102A CLOSED FRACTURE OF PROXIMAL END OF LEFT RADIUS, UNSPECIFIED FRACTURE MORPHOLOGY, INITIAL ENCOUNTER: ICD-10-CM

## 2025-02-09 DIAGNOSIS — M79.632 PAIN OF LEFT FOREARM: ICD-10-CM

## 2025-02-09 DIAGNOSIS — S56.912A STRAIN OF LEFT ELBOW, INITIAL ENCOUNTER: ICD-10-CM

## 2025-02-09 DIAGNOSIS — M79.632 PAIN OF LEFT FOREARM: Primary | ICD-10-CM

## 2025-02-09 PROCEDURE — 73110 X-RAY EXAM OF WRIST: CPT | Mod: LEFT SIDE | Performed by: RADIOLOGY

## 2025-02-09 PROCEDURE — 73080 X-RAY EXAM OF ELBOW: CPT | Mod: LT

## 2025-02-09 PROCEDURE — 73080 X-RAY EXAM OF ELBOW: CPT | Mod: LEFT SIDE | Performed by: RADIOLOGY

## 2025-02-09 PROCEDURE — 73110 X-RAY EXAM OF WRIST: CPT | Mod: LT

## 2025-02-09 ASSESSMENT — ENCOUNTER SYMPTOMS
MUSCULOSKELETAL NEGATIVE: 1
CONSTITUTIONAL NEGATIVE: 1

## 2025-02-09 ASSESSMENT — PATIENT HEALTH QUESTIONNAIRE - PHQ9
2. FEELING DOWN, DEPRESSED OR HOPELESS: NOT AT ALL
1. LITTLE INTEREST OR PLEASURE IN DOING THINGS: NOT AT ALL
SUM OF ALL RESPONSES TO PHQ9 QUESTIONS 1 AND 2: 0

## 2025-02-09 ASSESSMENT — PAIN SCALES - GENERAL: PAINLEVEL_OUTOF10: 9

## 2025-02-09 NOTE — PROGRESS NOTES
Subjective   Patient ID: Long Rice is a 44 y.o. male. They present today with a chief complaint of Arm Injury (LEFT ARM ELBOW AND PALM OF HAND BY THUM LIFTING IT UP AND MAKING A FIST HURTS YESTARDAY AT 4PM).    History of Present Illness    Arm Injury      Past Medical History  Allergies as of 02/09/2025    (No Known Allergies)       (Not in a hospital admission)       Past Medical History:   Diagnosis Date    Allergic     Anxiety     Depression     Other specified health status 11/28/2018    No pertinent past medical history       Past Surgical History:   Procedure Laterality Date    OTHER SURGICAL HISTORY  11/28/2018    No history of surgery    WISDOM TOOTH EXTRACTION  08/05/2023        reports that he has never smoked. He has never used smokeless tobacco. He reports that he does not drink alcohol and does not use drugs.    Review of Systems  Review of Systems   Constitutional: Negative.    Musculoskeletal: Negative.                                   Objective    Vitals:    02/09/25 1258   BP: (!) 145/93   BP Location: Left arm   Patient Position: Sitting   BP Cuff Size: Adult   Pulse: 98   Resp: 17   Temp: 37 °C (98.6 °F)   TempSrc: Oral   SpO2: 97%     No LMP for male patient.    Physical Exam  Constitutional:       Appearance: Normal appearance.   Musculoskeletal:      Left elbow: Swelling present.      Left wrist: Swelling and tenderness present.        Arms:       Comments: Swelling of posterior aspect  Ecchymoses and swelling of left plantar aspect of scaphoid and 1th metacarpal area   Neurological:      Mental Status: He is alert.         Procedures    Point of Care Test & Imaging Results from this visit  No results found for this visit on 02/09/25.   No results found.    Diagnostic study results (if any) were reviewed by [facility].    Assessment/Plan   Allergies, medications, history, and pertinent labs/EKGs/Imaging reviewed by Estrella Lamas MD.     Medical Decision Making      Orders  and Diagnoses  Diagnoses and all orders for this visit:  Pain of left forearm  -     XR elbow left 3+ views; Future  -     XR wrist left 3+ views; Future      Medical Admin Record      Patient disposition: Home    Electronically signed by [facility]  2:57 PM

## 2025-02-09 NOTE — PATIENT INSTRUCTIONS
"R.I.C.E.    The general care of your injury includes the following: Resting, Icing, Compressing and Elevating the injured area. Remember this as \"RICE.\"    REST: Limit the use of the injured body part.  ICE: By applying ice to the affected area, swelling and pain can be reduced. Place some ice cubes in a re-sealable (Ziploc) bag and add some water. Put a thin washcloth between the bag and your skin. Apply the ice bag to the area for at least 20 minutes. Do this at least 4 times per day. Using the ice for longer times and more frequently is OK. NEVER APPLY ICE DIRECTLY TO THE SKIN.  COMPRESS: Compression means to apply pressure around the injured area such as with a splint, cast or an ace bandage.   Compression decreases swelling and improves comfort.   Compression should be tight enough to relieve swelling but not so tight as to decrease circulation. Increasing pain, numbness, tingling, or change in skin color, are all signs of decreased circulation.  ELEVATE: Elevate the injured part. For example, elevate your foot by placing it on a chair while sitting, or propping it up on pillows when lying down.     Take 600MG ibuprofen with meals and plenty of water up to 3 times a day as needed for pian  "

## 2025-02-10 ENCOUNTER — OFFICE VISIT (OUTPATIENT)
Dept: ORTHOPEDIC SURGERY | Facility: HOSPITAL | Age: 45
End: 2025-02-10
Payer: COMMERCIAL

## 2025-02-10 VITALS — WEIGHT: 235 LBS | BODY MASS INDEX: 30.16 KG/M2 | HEIGHT: 74 IN

## 2025-02-10 DIAGNOSIS — S52.135A NONDISPLACED FRACTURE OF NECK OF LEFT RADIUS, INITIAL ENCOUNTER FOR CLOSED FRACTURE: Primary | ICD-10-CM

## 2025-02-10 PROCEDURE — 99204 OFFICE O/P NEW MOD 45 MIN: CPT | Performed by: PHYSICIAN ASSISTANT

## 2025-02-10 PROCEDURE — 99214 OFFICE O/P EST MOD 30 MIN: CPT | Performed by: PHYSICIAN ASSISTANT

## 2025-02-10 PROCEDURE — 3008F BODY MASS INDEX DOCD: CPT | Performed by: PHYSICIAN ASSISTANT

## 2025-02-10 ASSESSMENT — PAIN SCALES - GENERAL: PAINLEVEL_OUTOF10: 8

## 2025-02-10 ASSESSMENT — PAIN - FUNCTIONAL ASSESSMENT: PAIN_FUNCTIONAL_ASSESSMENT: 0-10

## 2025-02-10 NOTE — PATIENT INSTRUCTIONS
Wear your sling for comfort; you can remove for range of motion exercises    Patient will increase their dietary calcium intake (milk,yogurt) and should get 1200 mg of calcium per day. They will also take a vitamin D supplement.    Avoid lifting with the left arm    The patient was given a prescription for occupational therapy.  Occupational therapy is medically necessary to improve strength, balance, range of motion and functional outcomes after injury and/or surgery.    Follow up in 6 weeks or as needed

## 2025-03-13 NOTE — PROGRESS NOTES
NPV-   History of Present Illness  44 y.o.male presents at same day walk in clinic for left elbow pain  1. Nondisplaced fracture of neck of left radius, initial encounter for closed fracture  Referral to Occupational Therapy        Mechanism of injury: FOOSH on ice  Date of Injury/Pain: 2/9/25  Location of pain: elbow  Frequency of Pain: worse with movement  Associated symptoms? Swelling.  Previous treatment? ED, xray, sling      Past Medical History:   Diagnosis Date    Allergic     Anxiety     Depression     Other specified health status 11/28/2018    No pertinent past medical history        No Known Allergies     Past Surgical History:   Procedure Laterality Date    OTHER SURGICAL HISTORY  11/28/2018    No history of surgery    WISDOM TOOTH EXTRACTION  08/05/2023        Family History   Problem Relation Name Age of Onset    No Known Problems Mother      No Known Problems Father          Social History     Socioeconomic History    Marital status: Single     Spouse name: Not on file    Number of children: Not on file    Years of education: Not on file    Highest education level: Not on file   Occupational History    Not on file   Tobacco Use    Smoking status: Never    Smokeless tobacco: Never   Substance and Sexual Activity    Alcohol use: Never    Drug use: Never    Sexual activity: Yes     Partners: Female     Birth control/protection: Condom Male   Other Topics Concern    Not on file   Social History Narrative    Not on file     Social Drivers of Health     Financial Resource Strain: Not on file   Food Insecurity: Not on file   Transportation Needs: Not on file   Physical Activity: Not on file   Stress: Not on file   Social Connections: Not on file   Intimate Partner Violence: Not on file   Housing Stability: Not on file        CURRENT MEDICATIONS:   Current Outpatient Medications   Medication Sig Dispense Refill    albuterol (ProAir HFA) 90 mcg/actuation inhaler ProAir  (90 Base) MCG/ACT AERS   Refills:  0       Active      azelastine 205.5 mcg (0.15 %) spray,non-aerosol Azelastine HCl - 0.15 % Nasal Solution   Refills: 0       Active      buPROPion XL (Wellbutrin XL) 300 mg 24 hr tablet Take 1 tablet (300 mg) by mouth once daily.      montelukast (Singulair) 10 mg tablet Montelukast Sodium 10 MG Oral Tablet   Refills: 0       Active      PARoxetine (PaxiL) 30 mg tablet Paxil 30 MG Oral Tablet   Refills: 0       Active      triamcinolone (Nasacort) 55 mcg nasal inhaler Nasacort 55 MCG/ACT AERS   Refills: 0       Active       No current facility-administered medications for this visit.        27 point review of systems negative except what is stated in HPI    Constitutional Exam: patient's height and weight reviewed, well-kempt  Psychiatric Exam: alert and oriented x 3, appropriate mood and behavior  Eye Exam: NATALIA, EOMI  Pulmonary Exam: breathing non-labored, no apparent distress  Lymphatic exam: no appreciable lymphadenopathy in the lower extremities  Cardiovascular exam: DP pulses 2+ bilaterally, PT 2+ bilaterally, toes are pink with good capillary refill, no pitting edema  Skin exam: no open lesions, rashes, abrasions or ulcerations  Neurological exam: sensation to light touch intact in both lower extremities in peripheral and dermatomal distributions (except for any abnormalities noted in musculoskeletal exam)      On examination of the left elbow;  Normal alignment;  Minimal swelling, no ecchymosis   Normal elbow extension,flexion, pronation and supination, Normal ROM in wrist flexion, pronation and supination, ROM finger and thumb normal  Normal strength in elbow extension, flexion, pronation and supination; wrist finger and thumb strength normal; normal  strength  Tenderness to palpation: radial head  No tenderness over the olecranon UCL, radius, ulna, medial or lateral epicondyles, scaphoid  NVI, good cap refill    IMAGING  I personally reviewed the patient's x-ray images and reports of the left elbow.  The xrays show a nondisplaced fracture of the radial necck.  Normal joint spacing        ASSESSMENT/PLAN    ASSESSMENT: left elbow radial neck frature    PLAN: I discussed with the patient the differential diagnosis, complex overuse and degenerative related nature of the condition(s) and available treatment option(s). He will continue his sling as needed. He was shown ROM exercises. The patient was given a prescription for occupational therapy.  Occupational therapy is medically necessary to improve strength, balance, range of motion and functional outcomes after injury and/or surgery. Patient will increase their dietary calcium intake (milk,yogurt) and should get 1200 mg of calcium per day. They will also take a vitamin D supplement. All the patient's questions were answered. The patient agrees with the above plan.  Follow up in 6 weeks with repeat left elbow xrays with AP, lateral and oblique views to evaluate bone healing.        Tanner Emery PA-C

## 2025-03-31 ENCOUNTER — APPOINTMENT (OUTPATIENT)
Dept: ORTHOPEDIC SURGERY | Facility: HOSPITAL | Age: 45
End: 2025-03-31
Payer: COMMERCIAL

## 2025-06-24 ASSESSMENT — PROMIS GLOBAL HEALTH SCALE
CARRYOUT_PHYSICAL_ACTIVITIES: COMPLETELY
RATE_MENTAL_HEALTH: VERY GOOD
RATE_QUALITY_OF_LIFE: VERY GOOD
CARRYOUT_SOCIAL_ACTIVITIES: EXCELLENT
RATE_AVERAGE_FATIGUE: MILD
RATE_AVERAGE_PAIN: 0
RATE_PHYSICAL_HEALTH: VERY GOOD
RATE_SOCIAL_SATISFACTION: EXCELLENT
RATE_GENERAL_HEALTH: VERY GOOD
EMOTIONAL_PROBLEMS: SOMETIMES

## 2025-06-26 ENCOUNTER — APPOINTMENT (OUTPATIENT)
Dept: PRIMARY CARE | Facility: CLINIC | Age: 45
End: 2025-06-26
Payer: COMMERCIAL

## 2025-06-26 VITALS
DIASTOLIC BLOOD PRESSURE: 86 MMHG | HEIGHT: 74 IN | TEMPERATURE: 97 F | OXYGEN SATURATION: 96 % | BODY MASS INDEX: 30.03 KG/M2 | WEIGHT: 234 LBS | SYSTOLIC BLOOD PRESSURE: 134 MMHG | HEART RATE: 89 BPM

## 2025-06-26 DIAGNOSIS — E66.811 CLASS 1 OBESITY DUE TO EXCESS CALORIES WITHOUT SERIOUS COMORBIDITY WITH BODY MASS INDEX (BMI) OF 30.0 TO 30.9 IN ADULT: ICD-10-CM

## 2025-06-26 DIAGNOSIS — F32.A DEPRESSION, CONTROLLED: ICD-10-CM

## 2025-06-26 DIAGNOSIS — E66.09 CLASS 1 OBESITY DUE TO EXCESS CALORIES WITHOUT SERIOUS COMORBIDITY WITH BODY MASS INDEX (BMI) OF 30.0 TO 30.9 IN ADULT: ICD-10-CM

## 2025-06-26 DIAGNOSIS — Z12.11 ENCOUNTER FOR SCREENING FOR MALIGNANT NEOPLASM OF COLON: ICD-10-CM

## 2025-06-26 DIAGNOSIS — Z00.00 HEALTHCARE MAINTENANCE: Primary | ICD-10-CM

## 2025-06-26 PROCEDURE — 1036F TOBACCO NON-USER: CPT | Performed by: STUDENT IN AN ORGANIZED HEALTH CARE EDUCATION/TRAINING PROGRAM

## 2025-06-26 PROCEDURE — 90715 TDAP VACCINE 7 YRS/> IM: CPT | Performed by: STUDENT IN AN ORGANIZED HEALTH CARE EDUCATION/TRAINING PROGRAM

## 2025-06-26 PROCEDURE — 3008F BODY MASS INDEX DOCD: CPT | Performed by: STUDENT IN AN ORGANIZED HEALTH CARE EDUCATION/TRAINING PROGRAM

## 2025-06-26 PROCEDURE — 99396 PREV VISIT EST AGE 40-64: CPT | Performed by: STUDENT IN AN ORGANIZED HEALTH CARE EDUCATION/TRAINING PROGRAM

## 2025-06-26 PROCEDURE — 90471 IMMUNIZATION ADMIN: CPT | Performed by: STUDENT IN AN ORGANIZED HEALTH CARE EDUCATION/TRAINING PROGRAM

## 2025-06-26 ASSESSMENT — PAIN SCALES - GENERAL: PAINLEVEL_OUTOF10: 0-NO PAIN

## 2025-06-26 NOTE — PROGRESS NOTES
Subjective   Patient ID: Long Rice is a 44 y.o. male who presents for No chief complaint on file..  History of Present Illness  The patient is a 44-year-old male who presents today for his physical exam.    He reports maintaining good health overall, with the exception of a fall on ice in February 2025, which resulted in a nondisplaced fracture of the neck of the left radius. This injury was managed with a sling for a few days and subsequently healed without further intervention. He has been assisting his parents with their healthcare needs, including his mother's surgeries and recovery. He engages in daily physical activity, walking his dog for approximately 30 minutes, although this routine has been disrupted recently due to hot weather. He maintains a balanced diet, including overnight oats, blueberries, kale salad, and chickpeas, and has increased his home cooking activities.    He is under the care of Dr. Srivastava, a dentist, whom he visits biannually for cleanings. He has previously undergone a colonoscopy and prefers the Cologuard test for future screenings. He received his COVID-19 and influenza vaccines at [facility] in either October 2024 or November 2024. His last tetanus vaccine was administered over a decade ago following a dental injury sustained from a fall.    He is currently on paroxetine 30 mg and bupropion 300 mg, which are prescribed by his psychiatrist. He recently refilled his 3-month supply of these medications. He reports that his depression is well-managed and he is coping effectively despite recent stressors. He also sees his therapist once a month.    He is also on montelukast, Nasonex, and azelastine for allergy management, prescribed by Dr. Nvaarro. He receives monthly allergy injections and reports good control of his allergy symptoms.    He has noticed a slight weight gain, which he attributes to regular self-weighing.    SOCIAL HISTORY  He is self-employed as a  and board  ". He has been in a relationship with his girlfriend for almost 10 years and they live together with their [place] terrier.    FAMILY HISTORY  His mother had a perforation of her colon and underwent surgery for it last year. She also had her first knee replacement surgery the day after the patient fractured his elbow and had the other knee replaced last month.      PMHx, FHx, Social Hx, Surg Hx personally reviewed at this appointment. No pertinent findings and/or changes from prior (if applicable).    ROS: Unless specified above, pt denies wt gain/loss f/c HA LoC CP SOB NVDC. See HPI above, and scanned sheet (if applicable). All other systems are reviewed and are without complaint.     Objective     BP (!) 134/91   Pulse 89   Temp 36.1 °C (97 °F)   Ht 1.88 m (6' 2\")   Wt 106 kg (234 lb)   SpO2 96%   BMI 30.04 kg/m²      Physical Exam  General Appearance: Normal.  Vital signs: Within normal limits.  HEENT: Oral exam performed, no abnormalities noted.  Respiratory: Clear to auscultation, no wheezing, rales or rhonchi.  Skin: Warm and dry, no rash.  Neurological: Normal.  Psychiatric: Normal.      Lab Results   Component Value Date    WBC 8.2 11/17/2023    HGB 14.9 11/17/2023    HCT 44.8 11/17/2023     11/17/2023    CHOL 171 11/17/2023    TRIG 113 11/17/2023    HDL 42.7 11/17/2023    ALT 38 11/17/2023    AST 23 11/17/2023     11/17/2023    K 4.1 11/17/2023     11/17/2023    CREATININE 0.93 11/17/2023    BUN 13 11/17/2023    CO2 27 11/17/2023    TSH 1.98 09/08/2022    HGBA1C 4.8 11/17/2023     par     Current Outpatient Medications   Medication Instructions    albuterol (ProAir HFA) 90 mcg/actuation inhaler ProAir  (90 Base) MCG/ACT AERS   Refills: 0       Active    azelastine 205.5 mcg (0.15 %) spray,non-aerosol Azelastine HCl - 0.15 % Nasal Solution   Refills: 0       Active    buPROPion XL (Wellbutrin XL) 300 mg 24 hr tablet 1 tablet, Daily    montelukast (Singulair) 10 mg " tablet Montelukast Sodium 10 MG Oral Tablet   Refills: 0       Active    PARoxetine (PaxiL) 30 mg tablet Paxil 30 MG Oral Tablet   Refills: 0       Active    triamcinolone (Nasacort) 55 mcg nasal inhaler Nasacort 55 MCG/ACT AERS   Refills: 0       Active        XR wrist left 3+ views  Narrative: Interpreted By:  Montana Dill,   STUDY:  XR WRIST LEFT 3+ VIEWS; ;  2/9/2025 2:39 pm      INDICATION:  Signs/Symptoms:injury.      ,M79.632 Pain in left forearm      COMPARISON:  None.      ACCESSION NUMBER(S):  [id number]      ORDERING CLINICIAN:  MELLY CARLTON      FINDINGS:  Left wrist, four views      There is no fracture. There is no dislocation. There are no  degenerative changes. There is no lytic or sclerotic lesion. There is  no soft tissue abnormality seen.      Impression: Normal radiographs of the wrist          MACRO:  None      Signed by: Montana Dill 2/9/2025 3:02 PM  Dictation workstation:   [id number]  XR elbow left 3+ views  Narrative: Interpreted By:  Montana Dill,   STUDY:  XR ELBOW LEFT 3+ VIEWS; ;  2/9/2025 2:39 pm      INDICATION:  Signs/Symptoms:sliped and landed on elbow and hand.      ,M79.632 Pain in left forearm      COMPARISON:  None.      ACCESSION NUMBER(S):  [id number]      ORDERING CLINICIAN:  MELLY CARLTON      FINDINGS:  Left elbow, five views      There is a nondisplaced fracture through the radial neck. There is a  moderate-sized elbow effusion. No dislocation seen. No degenerative  changes      Impression: Nondisplaced radial neck fracture. Moderate-sized effusion          MACRO:  None      Signed by: Montana Dill 2/9/2025 3:02 PM  Dictation workstation:   [id number]           Assessment & Plan  1. Health maintenance:   - Blood pressure readings have shown improvement.  - Requisition for blood work, including CBC, CMP, lipid panel, and A1c, will be provided.  - Cologuard test will be ordered.  - Tetanus vaccine will be administered during this visit.    2.  Depression: Stable.  - Currently stable on Wellbutrin 300 mg and paroxetine 30 mg.  - Will inform via MyChart when refills are needed.    3. Allergies:   - Currently taking montelukast, Nasonex, and azelastine, and receives allergy shots once a month.  - Allergies are well-controlled with this regimen.    4. Weight management:   - Has gained weight since last visit in 11/2023, now weighing 234 pounds.  - Advised to engage in regular exercise and adhere to a balanced diet to facilitate weight loss.    Follow-up  - Blood work requisition  - Cologuard test  - Tetanus vaccine          Elmer Swenson MD       This medical note was created with the assistance of artificial intelligence (AI) for documentation purposes. The content has been reviewed and confirmed by the healthcare provider for accuracy and completeness. Patient consented to the use of audio recording and use of AI during their visit.

## 2025-06-26 NOTE — PATIENT INSTRUCTIONS
Please stop at any  lab ([address] if you choose to use my building) to complete your blood and/or urine work that I've ordered for you.    I will contact you with the results at my soonest convenience. I strongly urge you to use [employer] as this is the quickest and easiest way to access your results and receive my correspondences.     Your medications are up to date today, I will renew them when a refill is due.     I recommend that you lose weight via lifestyle modifications such as diet and exercise.     You received your tetanus shot today!     See me yearly for a complete physical exam, and sooner as needed for sick visits

## 2025-06-27 LAB
ALBUMIN SERPL-MCNC: 4.4 G/DL (ref 3.6–5.1)
ALP SERPL-CCNC: 61 U/L (ref 36–130)
ALT SERPL-CCNC: 26 U/L (ref 9–46)
ANION GAP SERPL CALCULATED.4IONS-SCNC: 8 MMOL/L (CALC) (ref 7–17)
AST SERPL-CCNC: 19 U/L (ref 10–40)
BASOPHILS # BLD AUTO: 53 CELLS/UL (ref 0–200)
BASOPHILS NFR BLD AUTO: 0.7 %
BILIRUB SERPL-MCNC: 0.5 MG/DL (ref 0.2–1.2)
BUN SERPL-MCNC: 12 MG/DL (ref 7–25)
CALCIUM SERPL-MCNC: 9.6 MG/DL (ref 8.6–10.3)
CHLORIDE SERPL-SCNC: 106 MMOL/L (ref 98–110)
CHOLEST SERPL-MCNC: 186 MG/DL
CHOLEST/HDLC SERPL: 4.3 (CALC)
CO2 SERPL-SCNC: 25 MMOL/L (ref 20–32)
CREAT SERPL-MCNC: 0.87 MG/DL (ref 0.6–1.29)
EGFRCR SERPLBLD CKD-EPI 2021: 109 ML/MIN/1.73M2
EOSINOPHIL # BLD AUTO: 251 CELLS/UL (ref 15–500)
EOSINOPHIL NFR BLD AUTO: 3.3 %
ERYTHROCYTE [DISTWIDTH] IN BLOOD BY AUTOMATED COUNT: 12.6 % (ref 11–15)
EST. AVERAGE GLUCOSE BLD GHB EST-MCNC: 105 MG/DL
EST. AVERAGE GLUCOSE BLD GHB EST-SCNC: 5.8 MMOL/L
GLUCOSE SERPL-MCNC: 93 MG/DL (ref 65–139)
HBA1C MFR BLD: 5.3 %
HCT VFR BLD AUTO: 47.8 % (ref 38.5–50)
HDLC SERPL-MCNC: 43 MG/DL
HGB BLD-MCNC: 15.6 G/DL (ref 13.2–17.1)
LDLC SERPL CALC-MCNC: 115 MG/DL (CALC)
LYMPHOCYTES # BLD AUTO: 2029 CELLS/UL (ref 850–3900)
LYMPHOCYTES NFR BLD AUTO: 26.7 %
MCH RBC QN AUTO: 29.4 PG (ref 27–33)
MCHC RBC AUTO-ENTMCNC: 32.6 G/DL (ref 32–36)
MCV RBC AUTO: 90.2 FL (ref 80–100)
MONOCYTES # BLD AUTO: 479 CELLS/UL (ref 200–950)
MONOCYTES NFR BLD AUTO: 6.3 %
NEUTROPHILS # BLD AUTO: 4788 CELLS/UL (ref 1500–7800)
NEUTROPHILS NFR BLD AUTO: 63 %
NONHDLC SERPL-MCNC: 143 MG/DL (CALC)
PLATELET # BLD AUTO: 341 THOUSAND/UL (ref 140–400)
PMV BLD REES-ECKER: 11.2 FL (ref 7.5–12.5)
POTASSIUM SERPL-SCNC: 4.6 MMOL/L (ref 3.5–5.3)
PROT SERPL-MCNC: 7 G/DL (ref 6.1–8.1)
RBC # BLD AUTO: 5.3 MILLION/UL (ref 4.2–5.8)
SODIUM SERPL-SCNC: 139 MMOL/L (ref 135–146)
TRIGL SERPL-MCNC: 162 MG/DL
WBC # BLD AUTO: 7.6 THOUSAND/UL (ref 3.8–10.8)

## 2025-07-24 LAB — NONINV COLON CA DNA+OCC BLD SCRN STL QL: NEGATIVE
